# Patient Record
Sex: MALE | Race: WHITE | Employment: STUDENT | URBAN - METROPOLITAN AREA
[De-identification: names, ages, dates, MRNs, and addresses within clinical notes are randomized per-mention and may not be internally consistent; named-entity substitution may affect disease eponyms.]

---

## 2017-02-20 ENCOUNTER — HOSPITAL ENCOUNTER (OUTPATIENT)
Age: 52
Discharge: HOME | End: 2017-02-20
Attending: FAMILY MEDICINE
Payer: COMMERCIAL

## 2017-02-20 ENCOUNTER — HOSPITAL ENCOUNTER (OUTPATIENT)
Dept: VASCULAR SURGERY | Age: 52
Discharge: HOME | End: 2017-02-20
Attending: FAMILY MEDICINE
Payer: COMMERCIAL

## 2017-02-20 ENCOUNTER — HOSPITAL ENCOUNTER (OUTPATIENT)
Dept: NON INVASIVE DIAGNOSTICS | Age: 52
Discharge: HOME | End: 2017-02-20
Attending: FAMILY MEDICINE
Payer: COMMERCIAL

## 2017-02-20 DIAGNOSIS — G47.00 INSOMNIA, UNSPECIFIED: ICD-10-CM

## 2017-02-20 DIAGNOSIS — R06.81 APNEA: ICD-10-CM

## 2017-02-20 DIAGNOSIS — R53.81 OTHER MALAISE AND FATIGUE: ICD-10-CM

## 2017-02-20 DIAGNOSIS — R53.83 OTHER MALAISE AND FATIGUE: ICD-10-CM

## 2017-02-20 DIAGNOSIS — H53.2 DIPLOPIA: ICD-10-CM

## 2017-02-20 DIAGNOSIS — R42 DIZZINESS AND GIDDINESS: ICD-10-CM

## 2017-02-20 PROCEDURE — 93880 EXTRACRANIAL BILAT STUDY: CPT

## 2017-02-20 PROCEDURE — 93306 TTE W/DOPPLER COMPLETE: CPT

## 2017-02-20 PROCEDURE — 70551 MRI BRAIN STEM W/O DYE: CPT

## 2017-02-20 NOTE — PROCEDURES
\[facility] [place] [facility]\""  *** FINAL REPORT ***    Name: Bernard Morris  MRN: [id number]    Outpatient  : 25 May 1965  HIS Order #: [id number]  [id number] [facility] Visit #: [id number]  Date: 2017    TYPE OF TEST: Cerebrovascular Duplex    REASON FOR TEST    Right Carotid:-             Proximal               Mid                 Distal  cm/s  Systolic  Diastolic  Systolic  Diastolic  Systolic  Diastolic  CCA:    809.5      31.0      128.0      35.0      116.0      31.0  Bulb:  ECA:    114.0      21.0  ICA:     65.0      21.0       72.0      21.0       72.0      21.0  ICA/CCA:  0.6       0.7    ICA Stenosis: Normal    Right Vertebral:-  Finding: Antegrade  Sys:       44.0  Giovanna:       18.0    Right Subclavian: Normal    Left Carotid:-            Proximal                Mid                 Distal  cm/s  Systolic  Diastolic  Systolic  Diastolic  Systolic  Diastolic  CCA:    181.3      22.0      100.0      25.0       83.0      25.0  Bulb:  ECA:     86.0      22.0  ICA:     69.0      23.0       84.0      29.0       91.0      30.0  ICA/CCA:  0.9       1.4    ICA Stenosis: Normal    Left Vertebral:-  Finding: Antegrade  Sys:       48.0  Giovanna:       13.0    Left Subclavian: Normal    INTERPRETATION/FINDINGS  Duplex images were obtained using 2-D gray scale, color flow, and  spectral Doppler analysis. 1. No evidence of significant arterial occlusive disease in the  internal carotid arteries without any evidence of visible plaque. 2. No significant stenosis in the external carotid arteries  bilaterally. 3. Antegrade flow in both vertebral arteries. 4. Normal flow in both subclavian arteries. ADDITIONAL COMMENTS    I have personally reviewed the data relevant to the interpretation of  this  study.     TECHNOLOGIST: Natalia Tabares, [facility], RVT/  Signed: 2017 09:08 AM    PHYSICIAN: Madelaine Pelaez MD  Signed: 2017 10:20 AM